# Patient Record
Sex: FEMALE | Race: WHITE | Employment: FULL TIME | URBAN - METROPOLITAN AREA
[De-identification: names, ages, dates, MRNs, and addresses within clinical notes are randomized per-mention and may not be internally consistent; named-entity substitution may affect disease eponyms.]

---

## 2017-07-17 PROCEDURE — 88175 CYTOPATH C/V AUTO FLUID REDO: CPT | Performed by: OBSTETRICS & GYNECOLOGY

## 2017-07-17 PROCEDURE — 87591 N.GONORRHOEAE DNA AMP PROB: CPT | Performed by: OBSTETRICS & GYNECOLOGY

## 2017-07-17 PROCEDURE — 87491 CHLMYD TRACH DNA AMP PROBE: CPT | Performed by: OBSTETRICS & GYNECOLOGY

## 2018-07-13 PROCEDURE — 87389 HIV-1 AG W/HIV-1&-2 AB AG IA: CPT | Performed by: OBSTETRICS & GYNECOLOGY

## 2018-07-13 PROCEDURE — 86900 BLOOD TYPING SEROLOGIC ABO: CPT | Performed by: OBSTETRICS & GYNECOLOGY

## 2018-07-13 PROCEDURE — 86850 RBC ANTIBODY SCREEN: CPT | Performed by: OBSTETRICS & GYNECOLOGY

## 2018-07-13 PROCEDURE — 86780 TREPONEMA PALLIDUM: CPT | Performed by: OBSTETRICS & GYNECOLOGY

## 2018-07-13 PROCEDURE — 87340 HEPATITIS B SURFACE AG IA: CPT | Performed by: OBSTETRICS & GYNECOLOGY

## 2018-07-13 PROCEDURE — 88175 CYTOPATH C/V AUTO FLUID REDO: CPT | Performed by: OBSTETRICS & GYNECOLOGY

## 2018-07-13 PROCEDURE — 87086 URINE CULTURE/COLONY COUNT: CPT | Performed by: OBSTETRICS & GYNECOLOGY

## 2018-07-13 PROCEDURE — 86901 BLOOD TYPING SEROLOGIC RH(D): CPT | Performed by: OBSTETRICS & GYNECOLOGY

## 2018-07-13 PROCEDURE — 86762 RUBELLA ANTIBODY: CPT | Performed by: OBSTETRICS & GYNECOLOGY

## 2018-07-13 PROCEDURE — 87624 HPV HI-RISK TYP POOLED RSLT: CPT | Performed by: OBSTETRICS & GYNECOLOGY

## 2018-11-21 PROCEDURE — 87389 HIV-1 AG W/HIV-1&-2 AB AG IA: CPT | Performed by: OBSTETRICS & GYNECOLOGY

## 2018-11-21 PROCEDURE — 86780 TREPONEMA PALLIDUM: CPT | Performed by: OBSTETRICS & GYNECOLOGY

## 2018-11-21 PROCEDURE — 86850 RBC ANTIBODY SCREEN: CPT | Performed by: OBSTETRICS & GYNECOLOGY

## 2019-01-22 ENCOUNTER — HOSPITAL ENCOUNTER (OUTPATIENT)
Facility: HOSPITAL | Age: 34
Setting detail: OBSERVATION
Discharge: HOME | End: 2019-01-22
Attending: OBSTETRICS & GYNECOLOGY | Admitting: OBSTETRICS & GYNECOLOGY
Payer: COMMERCIAL

## 2019-01-22 VITALS — HEART RATE: 94 BPM | DIASTOLIC BLOOD PRESSURE: 73 MMHG | SYSTOLIC BLOOD PRESSURE: 129 MMHG

## 2019-01-22 PROBLEM — Z34.90 PREGNANCY: Status: ACTIVE | Noted: 2019-01-22

## 2019-01-22 LAB
APTT PPP: 31.8 SECONDS (ref 23.2–35.3)
BASOPHILS # BLD: 0 K/UL (ref 0–0.2)
BASOPHILS NFR BLD: 0 %
EOSINOPHIL # BLD: 0.1 K/UL (ref 0–0.7)
EOSINOPHIL NFR BLD: 1 %
ERYTHROCYTE [DISTWIDTH] IN BLOOD BY AUTOMATED COUNT: 13.6 % (ref 11–15)
FIBRINOGEN PPP-MCNC: 712 MG/DL (ref 176–491)
HCT VFR BLD AUTO: 37 % (ref 35–48)
HGB BLD-MCNC: 12.4 G/DL (ref 12–16)
INR BLD: 1 (ref 0.9–1.2)
LYMPHOCYTES # BLD: 1.9 K/UL (ref 1–4)
LYMPHOCYTES NFR BLD: 12 %
MCH RBC QN AUTO: 30 PG (ref 27–32)
MCHC RBC AUTO-ENTMCNC: 33.5 G/DL (ref 32–37)
MCV RBC AUTO: 89.5 FL (ref 80–100)
MONOCYTES # BLD: 0.7 K/UL (ref 0–1)
MONOCYTES NFR BLD: 4 %
NEUTROPHILS # BLD AUTO: 12.9 K/UL (ref 1.8–7.7)
NEUTROPHILS NFR BLD: 83 %
PLATELET # BLD AUTO: 218 K/UL (ref 140–400)
PMV BLD AUTO: 9.8 FL (ref 7.4–10.3)
PROTHROMBIN TIME: 13.1 SECONDS (ref 11.8–14.5)
RBC # BLD AUTO: 4.14 M/UL (ref 3.7–5.4)
WBC # BLD AUTO: 15.6 K/UL (ref 4–11)

## 2019-01-22 PROCEDURE — 85730 THROMBOPLASTIN TIME PARTIAL: CPT | Performed by: OBSTETRICS & GYNECOLOGY

## 2019-01-22 PROCEDURE — 85362 FIBRIN DEGRADATION PRODUCTS: CPT | Performed by: OBSTETRICS & GYNECOLOGY

## 2019-01-22 PROCEDURE — 85384 FIBRINOGEN ACTIVITY: CPT | Performed by: OBSTETRICS & GYNECOLOGY

## 2019-01-22 PROCEDURE — 85610 PROTHROMBIN TIME: CPT | Performed by: OBSTETRICS & GYNECOLOGY

## 2019-01-22 PROCEDURE — 59025 FETAL NON-STRESS TEST: CPT

## 2019-01-22 PROCEDURE — 99203 OFFICE O/P NEW LOW 30 MIN: CPT

## 2019-01-22 PROCEDURE — 85025 COMPLETE CBC W/AUTO DIFF WBC: CPT | Performed by: OBSTETRICS & GYNECOLOGY

## 2019-01-22 PROCEDURE — 85460 HEMOGLOBIN FETAL: CPT | Performed by: OBSTETRICS & GYNECOLOGY

## 2019-01-22 PROCEDURE — 36415 COLL VENOUS BLD VENIPUNCTURE: CPT
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
## 2019-01-23 LAB — HGB F MFR BLD KLEIH BETKE: <0.1 %

## 2019-01-23 NOTE — TRIAGE
[facility]      Triage Note    Faith Minors Patient Status:  Observation    1985 MRN [id number]   Location [address]  Attending Arlene Keyes MD   Hosp Day # 0 PCP MD Venkata Marcum: outside while holding toddler      Cristal Chris RN  1/22/2019 7:37 PM

## 2019-02-08 ENCOUNTER — HOSPITAL ENCOUNTER (INPATIENT)
Facility: HOSPITAL | Age: 34
LOS: 2 days | Discharge: HOME | End: 2019-02-10
Attending: OBSTETRICS & GYNECOLOGY | Admitting: OBSTETRICS & GYNECOLOGY
Payer: COMMERCIAL

## 2019-02-08 LAB
ANTIBODY SCREEN: NEGATIVE
DEPRECATED RDW RBC AUTO: 42.5 FL (ref 35.1–46.3)
ERYTHROCYTE [DISTWIDTH] IN BLOOD BY AUTOMATED COUNT: 13.1 % (ref 11–15)
HCT VFR BLD AUTO: 37.3 % (ref 35–48)
HGB BLD-MCNC: 12.8 G/DL (ref 12–16)
MCH RBC QN AUTO: 30.7 PG (ref 26–34)
MCHC RBC AUTO-ENTMCNC: 34.3 G/DL (ref 31–37)
MCV RBC AUTO: 89.4 FL (ref 80–100)
PLATELET # BLD AUTO: 284 10(3)UL (ref 150–450)
RBC # BLD AUTO: 4.17 X10(6)UL (ref 3.8–5.3)
RH BLOOD TYPE: NEGATIVE
WBC # BLD AUTO: 14.3 X10(3) UL (ref 4–11)

## 2019-02-08 PROCEDURE — 85027 COMPLETE CBC AUTOMATED: CPT | Performed by: OBSTETRICS & GYNECOLOGY

## 2019-02-08 PROCEDURE — 86901 BLOOD TYPING SEROLOGIC RH(D): CPT | Performed by: OBSTETRICS & GYNECOLOGY

## 2019-02-08 PROCEDURE — 86850 RBC ANTIBODY SCREEN: CPT | Performed by: OBSTETRICS & GYNECOLOGY

## 2019-02-08 PROCEDURE — 0KQM0ZZ REPAIR PERINEUM MUSCLE, OPEN APPROACH: ICD-10-PCS | Performed by: OBSTETRICS & GYNECOLOGY

## 2019-02-08 PROCEDURE — 86900 BLOOD TYPING SEROLOGIC ABO: CPT | Performed by: OBSTETRICS & GYNECOLOGY

## 2019-02-08 RX ORDER — ACETAMINOPHEN 500 MG
500 TABLET ORAL ONCE AS NEEDED
Status: DISCONTINUED | OUTPATIENT
Start: 2019-02-08 | End: 2019-02-09 | Stop reason: HOSPADM

## 2019-02-08 RX ORDER — DEXTROSE, SODIUM CHLORIDE, SODIUM LACTATE, POTASSIUM CHLORIDE, AND CALCIUM CHLORIDE 5; .6; .31; .03; .02 G/100ML; G/100ML; G/100ML; G/100ML; G/100ML
INJECTION, SOLUTION INTRAVENOUS AS NEEDED
Status: DISCONTINUED | OUTPATIENT
Start: 2019-02-08 | End: 2019-02-09

## 2019-02-08 RX ORDER — DEXTROSE, SODIUM CHLORIDE, SODIUM LACTATE, POTASSIUM CHLORIDE, AND CALCIUM CHLORIDE 5; .6; .31; .03; .02 G/100ML; G/100ML; G/100ML; G/100ML; G/100ML
INJECTION, SOLUTION INTRAVENOUS
Status: COMPLETED
Start: 2019-02-08 | End: 2019-02-08

## 2019-02-08 RX ORDER — IBUPROFEN 600 MG/1
600 TABLET ORAL ONCE AS NEEDED
Status: DISCONTINUED | OUTPATIENT
Start: 2019-02-08 | End: 2019-02-09 | Stop reason: HOSPADM

## 2019-02-08 RX ORDER — SODIUM CHLORIDE 0.9 % (FLUSH) 0.9 %
10 SYRINGE (ML) INJECTION AS NEEDED
Status: DISCONTINUED | OUTPATIENT
Start: 2019-02-08 | End: 2019-02-09 | Stop reason: HOSPADM

## 2019-02-08 RX ORDER — AMMONIA INHALANTS 0.04 G/.3ML
0.3 INHALANT RESPIRATORY (INHALATION) AS NEEDED
Status: DISCONTINUED | OUTPATIENT
Start: 2019-02-08 | End: 2019-02-09

## 2019-02-08 RX ORDER — LIDOCAINE HYDROCHLORIDE 10 MG/ML
30 INJECTION, SOLUTION EPIDURAL; INFILTRATION; INTRACAUDAL; PERINEURAL ONCE
Status: COMPLETED | OUTPATIENT
Start: 2019-02-08 | End: 2019-02-09

## 2019-02-08 RX ORDER — TERBUTALINE SULFATE 1 MG/ML
0.25 INJECTION, SOLUTION SUBCUTANEOUS AS NEEDED
Status: DISCONTINUED | OUTPATIENT
Start: 2019-02-08 | End: 2019-02-09

## 2019-02-08 RX ORDER — ONDANSETRON 2 MG/ML
4 INJECTION INTRAMUSCULAR; INTRAVENOUS EVERY 6 HOURS PRN
Status: DISCONTINUED | OUTPATIENT
Start: 2019-02-08 | End: 2019-02-09 | Stop reason: HOSPADM

## 2019-02-09 LAB
HCT VFR BLD AUTO: 30.9 % (ref 35–48)
HGB BLD-MCNC: 10.4 G/DL (ref 12–16)

## 2019-02-09 PROCEDURE — 85014 HEMATOCRIT: CPT | Performed by: OBSTETRICS & GYNECOLOGY

## 2019-02-09 PROCEDURE — 85461 HEMOGLOBIN FETAL: CPT | Performed by: OBSTETRICS & GYNECOLOGY

## 2019-02-09 PROCEDURE — 85018 HEMOGLOBIN: CPT | Performed by: OBSTETRICS & GYNECOLOGY

## 2019-02-09 RX ORDER — DIAPER,BRIEF,INFANT-TODD,DISP
1 EACH MISCELLANEOUS EVERY 6 HOURS PRN
Status: DISCONTINUED | OUTPATIENT
Start: 2019-02-09 | End: 2019-02-10

## 2019-02-09 RX ORDER — PRENATAL VIT,CAL 76/IRON/FOLIC 29 MG-1 MG
1 TABLET ORAL DAILY
Status: DISCONTINUED | OUTPATIENT
Start: 2019-02-09 | End: 2019-02-10

## 2019-02-09 RX ORDER — SIMETHICONE 80 MG
80 TABLET,CHEWABLE ORAL 3 TIMES DAILY PRN
Status: DISCONTINUED | OUTPATIENT
Start: 2019-02-09 | End: 2019-02-10

## 2019-02-09 RX ORDER — SODIUM CHLORIDE 0.9 % (FLUSH) 0.9 %
10 SYRINGE (ML) INJECTION AS NEEDED
Status: DISCONTINUED | OUTPATIENT
Start: 2019-02-09 | End: 2019-02-10

## 2019-02-09 RX ORDER — ONDANSETRON 2 MG/ML
4 INJECTION INTRAMUSCULAR; INTRAVENOUS EVERY 6 HOURS PRN
Status: DISCONTINUED | OUTPATIENT
Start: 2019-02-09 | End: 2019-02-10

## 2019-02-09 RX ORDER — AMMONIA INHALANTS 0.04 G/.3ML
0.3 INHALANT RESPIRATORY (INHALATION) AS NEEDED
Status: DISCONTINUED | OUTPATIENT
Start: 2019-02-09 | End: 2019-02-10

## 2019-02-09 RX ORDER — BISACODYL 10 MG
10 SUPPOSITORY, RECTAL RECTAL ONCE AS NEEDED
Status: DISCONTINUED | OUTPATIENT
Start: 2019-02-09 | End: 2019-02-10

## 2019-02-09 RX ORDER — DOCUSATE SODIUM 100 MG/1
100 CAPSULE, LIQUID FILLED ORAL 2 TIMES DAILY
Status: DISCONTINUED | OUTPATIENT
Start: 2019-02-09 | End: 2019-02-10

## 2019-02-09 RX ORDER — IBUPROFEN 600 MG/1
600 TABLET ORAL EVERY 6 HOURS
Status: DISCONTINUED | OUTPATIENT
Start: 2019-02-09 | End: 2019-02-10

## 2019-02-09 RX ORDER — ACETAMINOPHEN 325 MG/1
650 TABLET ORAL EVERY 6 HOURS PRN
Status: DISCONTINUED | OUTPATIENT
Start: 2019-02-09 | End: 2019-02-10

## 2019-02-09 NOTE — PROGRESS NOTES
[facility]    OB/GYNE Progress Note      Donnamaria Prader Patient Status:  Inpatient    1985 MRN [id number]   Location [facility] 3SE Attending Bernardo Nguyen MD   Hosp Day # 1 PCP Geo Peña MD        Assessment/Plan 03/04/2016    SPECGRAVITY 1.020 01/07/2016    GLUUR neg 01/07/2016    NITRITE neg 01/07/2016                   Regi Berry MD  2/9/2019  10:52 AM

## 2019-02-09 NOTE — H&P
[address] Patient Status:  Inpatient    1985 MRN [id number]   Location [address]  Attending Bernardo Nguyen MD   Hosp Day # 1 PCP Geo Peña MD     Active Problems:    Iron defici guarding. Gravid, uterus nontender   Genitourinary: Vagina normal.   Genitourinary Comments: 9/52/-2, cephalic   Grossly ruptured, clear fluid   Musculoskeletal: Normal range of motion.    Neurological: She is alert and oriented to person, place, and time

## 2019-02-09 NOTE — PROGRESS NOTES
Pt is a 34year old female [facility] Patient presents with:  Leaking: Felt a gush of fluid come out around 6:30pm     Pt is  39w4d intra-uterine pregnancy. History obtained, consents signed.  Oriented to room, staff, and plan of car PRUDENCE   Known prior H/o PRUDENCE/AIN related to Ancef in March 2018   D/C Ancef   Would also stop bactrim in setting of PRUDENCE   Urine eosinophils, Stacia   Hold Lisinopril   Will cont gentle IVF   ID consult called for alternative Abx   If renal function not better in am   will get sonogram and   may need consider steroids- AIN?    Will follow

## 2019-02-09 NOTE — PROGRESS NOTES
Pt is a 34year old female [facility] Patient presents with:  Leaking: Felt a gush of fluid come out around 6:30pm     Pt is  39w4d intra-uterine pregnancy. History obtained, consents signed.  Oriented to room, staff, and plan of car

## 2019-02-09 NOTE — L&D DELIVERY NOTE
[facility]    Vaginal Delivery Note    Jessica Vegas Patient Status:  Inpatient    1985 MRN [id number]   Location [address]  Attending Alphonso Potter MD   Hosp Day # 1 PCP MD Sandra Sanchez

## 2019-02-09 NOTE — PROGRESS NOTES
Patient up to bathroom with assist x 2. Unable to void at this time. Patient transferred to mother/baby room 354 per wheelchair in stable condition with baby and personal belongings. Accompanied by significant other and staff.   Report given to mother/bab

## 2019-02-09 NOTE — PROGRESS NOTES
Received patient into room 354. Bedside shift report received from [facility]. Patient transferred to bed from cart. Bed in locked and low position. Side rails up X2. Vital signs stable, fundus firm at U/1, lochia small, no clots noted.   Patient unab

## 2019-02-09 NOTE — LACTATION NOTE
LACTATION NOTE - MOTHER      Evaluation Type: Inpatient    Problems identified  Problems identified: Knowledge deficit    Breastfeeding goal  Breastfeeding goal: To maintain breast milk feeding per patient goal    Maternal Assessment  Bilateral Breasts:  So

## 2019-02-09 NOTE — LACTATION NOTE
This note was copied from a baby's chart. LACTATION NOTE - INFANT    Evaluation Type  Evaluation Type: Inpatient    Problems & Assessment  Muscle tone: Appropriate for GA    Feeding Assessment  Summary Current Feeding: Breastfeeding exclusively; Adlib  Shania

## 2019-02-10 VITALS
TEMPERATURE: 98 F | RESPIRATION RATE: 18 BRPM | DIASTOLIC BLOOD PRESSURE: 66 MMHG | HEART RATE: 70 BPM | SYSTOLIC BLOOD PRESSURE: 111 MMHG

## 2019-02-10 RX ORDER — PSEUDOEPHEDRINE HCL 30 MG
100 TABLET ORAL 2 TIMES DAILY
Qty: 60 CAPSULE | Refills: 1 | Status: SHIPPED | OUTPATIENT
Start: 2019-02-10 | End: 2019-05-28

## 2019-02-10 RX ORDER — IBUPROFEN 600 MG/1
600 TABLET ORAL EVERY 6 HOURS
Qty: 60 TABLET | Refills: 1 | Status: SHIPPED | OUTPATIENT
Start: 2019-02-10 | End: 2019-03-21

## 2019-02-10 RX ORDER — DIAPER,BRIEF,INFANT-TODD,DISP
1 EACH MISCELLANEOUS EVERY 6 HOURS PRN
Refills: 0 | Status: SHIPPED | COMMUNITY
Start: 2019-02-10 | End: 2019-03-21

## 2019-02-10 NOTE — DISCHARGE SUMMARY
[facility]    Discharge Summary    Corine Welsh Patient Status:  Inpatient    1985 MRN [id number]   Location [facility] 3SE Attending Debbi Hudson MD   Hosp Day # 2 PCP Radha Salas MD     Date of Admission:  [address]           Sola Soares MD In 6 weeks.     Specialty:  OBSTETRICS & GYNECOLOGY  Contact information:  [facility] [place] [address]  [phone]                       Other Discharge Instructions:       No he

## 2019-02-10 NOTE — LACTATION NOTE
LACTATION NOTE - MOTHER      Evaluation Type: Inpatient from 1000  Maternal history  Maternal history: Polycystic ovarian syndrome (PCOS)    Breastfeeding goal  Breastfeeding goal: To maintain breast milk feeding per patient goal    Maternal Assessment  Bi

## 2021-05-19 ENCOUNTER — LAB ENCOUNTER (OUTPATIENT)
Dept: LAB | Age: 36
End: 2021-05-19
Attending: OBSTETRICS & GYNECOLOGY
Payer: COMMERCIAL

## 2021-05-19 ENCOUNTER — TELEPHONE (OUTPATIENT)
Dept: OBGYN UNIT | Facility: HOSPITAL | Age: 36
End: 2021-05-19

## 2021-05-19 DIAGNOSIS — Z34.80 SUPERVISION OF OTHER NORMAL PREGNANCY: ICD-10-CM

## 2021-05-20 ENCOUNTER — TELEPHONE (OUTPATIENT)
Dept: OBGYN UNIT | Facility: HOSPITAL | Age: 36
End: 2021-05-20

## 2021-05-22 ENCOUNTER — ANESTHESIA (OUTPATIENT)
Dept: OBGYN UNIT | Facility: HOSPITAL | Age: 36
End: 2021-05-22
Payer: COMMERCIAL

## 2021-05-22 ENCOUNTER — ANESTHESIA EVENT (OUTPATIENT)
Dept: OBGYN UNIT | Facility: HOSPITAL | Age: 36
End: 2021-05-22
Payer: COMMERCIAL

## 2021-05-22 ENCOUNTER — APPOINTMENT (OUTPATIENT)
Dept: OBGYN CLINIC | Facility: HOSPITAL | Age: 36
End: 2021-05-22
Payer: COMMERCIAL

## 2021-05-22 ENCOUNTER — HOSPITAL ENCOUNTER (INPATIENT)
Facility: HOSPITAL | Age: 36
LOS: 2 days | Discharge: HOME | End: 2021-05-24
Attending: OBSTETRICS & GYNECOLOGY | Admitting: OBSTETRICS & GYNECOLOGY
Payer: COMMERCIAL

## 2021-05-22 PROBLEM — Z34.80 SUPERVISION OF OTHER NORMAL PREGNANCY: Status: ACTIVE | Noted: 2021-05-22

## 2021-05-22 PROCEDURE — 86850 RBC ANTIBODY SCREEN: CPT | Performed by: OBSTETRICS & GYNECOLOGY

## 2021-05-22 PROCEDURE — 86900 BLOOD TYPING SEROLOGIC ABO: CPT | Performed by: OBSTETRICS & GYNECOLOGY

## 2021-05-22 PROCEDURE — 86780 TREPONEMA PALLIDUM: CPT | Performed by: OBSTETRICS & GYNECOLOGY

## 2021-05-22 PROCEDURE — 86701 HIV-1ANTIBODY: CPT | Performed by: OBSTETRICS & GYNECOLOGY

## 2021-05-22 PROCEDURE — 85461 HEMOGLOBIN FETAL: CPT | Performed by: OBSTETRICS & GYNECOLOGY

## 2021-05-22 PROCEDURE — 86901 BLOOD TYPING SEROLOGIC RH(D): CPT | Performed by: OBSTETRICS & GYNECOLOGY

## 2021-05-22 PROCEDURE — 3E033VJ INTRODUCTION OF OTHER HORMONE INTO PERIPHERAL VEIN, PERCUTANEOUS APPROACH: ICD-10-PCS | Performed by: OBSTETRICS & GYNECOLOGY

## 2021-05-22 PROCEDURE — 85025 COMPLETE CBC W/AUTO DIFF WBC: CPT | Performed by: OBSTETRICS & GYNECOLOGY

## 2021-05-22 RX ORDER — BUPIVACAINE HCL/0.9 % NACL/PF 0.25 %
5 PLASTIC BAG, INJECTION (ML) EPIDURAL AS NEEDED
Status: DISCONTINUED | OUTPATIENT
Start: 2021-05-22 | End: 2021-05-23 | Stop reason: HOSPADM

## 2021-05-22 RX ORDER — DOCUSATE SODIUM 100 MG/1
100 CAPSULE, LIQUID FILLED ORAL 2 TIMES DAILY
Status: DISCONTINUED | OUTPATIENT
Start: 2021-05-23 | End: 2021-05-24

## 2021-05-22 RX ORDER — ACETAMINOPHEN 650 MG/1
650 SUPPOSITORY RECTAL EVERY 6 HOURS PRN
Status: DISCONTINUED | OUTPATIENT
Start: 2021-05-22 | End: 2021-05-22 | Stop reason: HOSPADM

## 2021-05-22 RX ORDER — BISACODYL 10 MG
10 SUPPOSITORY, RECTAL RECTAL ONCE AS NEEDED
Status: DISCONTINUED | OUTPATIENT
Start: 2021-05-22 | End: 2021-05-24

## 2021-05-22 RX ORDER — IBUPROFEN 600 MG/1
600 TABLET ORAL EVERY 6 HOURS
Status: DISCONTINUED | OUTPATIENT
Start: 2021-05-22 | End: 2021-05-24

## 2021-05-22 RX ORDER — CHOLECALCIFEROL (VITAMIN D3) 25 MCG
1 TABLET,CHEWABLE ORAL DAILY
Status: DISCONTINUED | OUTPATIENT
Start: 2021-05-23 | End: 2021-05-24

## 2021-05-22 RX ORDER — SIMETHICONE 80 MG
80 TABLET,CHEWABLE ORAL 3 TIMES DAILY PRN
Status: DISCONTINUED | OUTPATIENT
Start: 2021-05-22 | End: 2021-05-24

## 2021-05-22 RX ORDER — LIDOCAINE HYDROCHLORIDE 10 MG/ML
INJECTION, SOLUTION EPIDURAL; INFILTRATION; INTRACAUDAL; PERINEURAL
Status: DISPENSED
Start: 2021-05-22 | End: 2021-05-22

## 2021-05-22 RX ORDER — TRISODIUM CITRATE DIHYDRATE AND CITRIC ACID MONOHYDRATE 500; 334 MG/5ML; MG/5ML
30 SOLUTION ORAL AS NEEDED
Status: DISCONTINUED | OUTPATIENT
Start: 2021-05-22 | End: 2021-05-22 | Stop reason: HOSPADM

## 2021-05-22 RX ORDER — NALBUPHINE HCL 10 MG/ML
2.5 AMPUL (ML) INJECTION
Status: DISCONTINUED | OUTPATIENT
Start: 2021-05-22 | End: 2021-05-23 | Stop reason: HOSPADM

## 2021-05-22 RX ORDER — BUPIVACAINE HYDROCHLORIDE 2.5 MG/ML
20 INJECTION, SOLUTION EPIDURAL; INFILTRATION; INTRACAUDAL ONCE
Status: COMPLETED | OUTPATIENT
Start: 2021-05-22 | End: 2021-05-22

## 2021-05-22 RX ORDER — AMMONIA INHALANTS 0.04 G/.3ML
0.3 INHALANT RESPIRATORY (INHALATION) AS NEEDED
Status: DISCONTINUED | OUTPATIENT
Start: 2021-05-22 | End: 2021-05-22 | Stop reason: HOSPADM

## 2021-05-22 RX ORDER — LIDOCAINE HYDROCHLORIDE 10 MG/ML
INJECTION, SOLUTION EPIDURAL; INFILTRATION; INTRACAUDAL; PERINEURAL AS NEEDED
Status: DISCONTINUED | OUTPATIENT
Start: 2021-05-22 | End: 2021-05-22 | Stop reason: SURG

## 2021-05-22 RX ORDER — TERBUTALINE SULFATE 1 MG/ML
0.25 INJECTION, SOLUTION SUBCUTANEOUS AS NEEDED
Status: DISCONTINUED | OUTPATIENT
Start: 2021-05-22 | End: 2021-05-22 | Stop reason: HOSPADM

## 2021-05-22 RX ORDER — DIAPER,BRIEF,INFANT-TODD,DISP
1 EACH MISCELLANEOUS EVERY 6 HOURS PRN
Status: DISCONTINUED | OUTPATIENT
Start: 2021-05-22 | End: 2021-05-24

## 2021-05-22 RX ORDER — ONDANSETRON 2 MG/ML
4 INJECTION INTRAMUSCULAR; INTRAVENOUS EVERY 6 HOURS PRN
Status: DISCONTINUED | OUTPATIENT
Start: 2021-05-22 | End: 2021-05-24

## 2021-05-22 RX ORDER — AMMONIA INHALANTS 0.04 G/.3ML
0.3 INHALANT RESPIRATORY (INHALATION) AS NEEDED
Status: DISCONTINUED | OUTPATIENT
Start: 2021-05-22 | End: 2021-05-24

## 2021-05-22 RX ORDER — ACETAMINOPHEN 325 MG/1
650 TABLET ORAL EVERY 6 HOURS PRN
Status: DISCONTINUED | OUTPATIENT
Start: 2021-05-22 | End: 2021-05-24

## 2021-05-22 RX ORDER — IBUPROFEN 600 MG/1
600 TABLET ORAL ONCE AS NEEDED
Status: DISCONTINUED | OUTPATIENT
Start: 2021-05-22 | End: 2021-05-22 | Stop reason: HOSPADM

## 2021-05-22 RX ORDER — LIDOCAINE HYDROCHLORIDE AND EPINEPHRINE 15; 5 MG/ML; UG/ML
INJECTION, SOLUTION EPIDURAL
Status: COMPLETED | OUTPATIENT
Start: 2021-05-22 | End: 2021-05-22

## 2021-05-22 RX ADMIN — LIDOCAINE HYDROCHLORIDE 3 ML: 10 INJECTION, SOLUTION EPIDURAL; INFILTRATION; INTRACAUDAL; PERINEURAL at 18:42:00

## 2021-05-22 RX ADMIN — BUPIVACAINE HYDROCHLORIDE 3 ML: 2.5 INJECTION, SOLUTION EPIDURAL; INFILTRATION; INTRACAUDAL at 18:51:00

## 2021-05-22 RX ADMIN — LIDOCAINE HYDROCHLORIDE AND EPINEPHRINE 5 ML: 15; 5 INJECTION, SOLUTION EPIDURAL at 18:47:00

## 2021-05-22 NOTE — H&P
[address] Patient Status:  Inpatient    1985 MRN [id number]   Location [address] [facility] Attending Arlene Keyes MD   Hosp Day # 0 PCP Sherin Garner MD     Date of Admi the past 24 hour(s))   CBC W/ DIFFERENTIAL    Collection Time: 05/22/21  7:34 AM   Result Value Ref Range    WBC 8.8 4.0 - 11.0 x10(3) uL    RBC 3.94 3.80 - 5.30 x10(6)uL    HGB 12.0 12.0 - 16.0 g/dL    HCT 35.4 35.0 - 48.0 %    MCV 89.8 80.0 - 100.0 fL appropriate consents will be signed at the [address] [place] [address]  5/22/2021  12:30 PM

## 2021-05-22 NOTE — PROGRESS NOTES
Pt is a 37year old female admitted to [address] [place] [address]. Patient presents with:  Scheduled Induction: post dates     Pt is  40w6d intra-uterine pregnancy. History obtained, consents signed. Oriented to room, staff, and plan of care.

## 2021-05-23 PROCEDURE — 85025 COMPLETE CBC W/AUTO DIFF WBC: CPT | Performed by: OBSTETRICS & GYNECOLOGY

## 2021-05-23 PROCEDURE — 3E0334Z INTRODUCTION OF SERUM, TOXOID AND VACCINE INTO PERIPHERAL VEIN, PERCUTANEOUS APPROACH: ICD-10-PCS | Performed by: OBSTETRICS & GYNECOLOGY

## 2021-05-23 NOTE — ANESTHESIA POSTPROCEDURE EVALUATION
Patient: Latosha Perez    Procedure Summary     Date: 05/22/21 Room / Location:     Anesthesia Start: 5090 Anesthesia Stop: 1917    Procedure: LABOR ANALGESIA Diagnosis:     Scheduled Providers:  Anesthesiologist: Saundra Tejeda MD    Anesthesia Type: e

## 2021-05-23 NOTE — PROGRESS NOTES
[facility]    OB/GYNE Progress Note      Aryan Lemon Patient Status:  Inpatient    1985 MRN [id number]   Location [facility] 3SE Attending Brook Moody MD   Hosp Day # 1 PCP Deng Dior MD       Assessment/Plan     A 31.8 01/22/2019    INR 1.0 01/22/2019    T4F 1.29 08/14/2014    TSH 0.556 05/28/2019       Lab Results   Component Value Date    RPR Non Reactive 03/04/2016    SPECGRAVITY 1.010 05/19/2021    NITRITE Negative 05/19/2021     No results found.     Elsa Blevins

## 2021-05-23 NOTE — L&D DELIVERY NOTE
[facility] - [facility]    Vaginal Delivery Note    Omar Walsh Patient Status:  Inpatient    1985 MRN [id number]   Location [address]  Attending Donato Camarena MD   Hosp Day # 0 PCP Lucy Prabhakar MD     Delivery

## 2021-05-23 NOTE — ANESTHESIA PREPROCEDURE EVALUATION
Anesthesia PreOp Note    HPI:     Belle Quintero is a 37year old female who presents for preoperative consultation requested by: * No surgeons listed *    Date of Surgery: 5/22/2021    * No procedures listed *  Indication: * No pre-op diagnosis entered * pete-units/min at 05/22/21 1700  Sod Citrate-Citric Acid (BICITRA) solution 30 mL, 30 mL, Oral, PRN, Tao COOK MD  Terbutaline Sulfate (BRETHINE) 1 MG/ML injection 0.25 mg, 0.25 mg, Subcutaneous, PRN, Guevara Man MD  lidocaine (PF) (XYLOCAINE) Not Asked        Blood Transfusions: Not Asked        Caffeine Concern: Not Asked        Occupational Exposure: Not Asked        Hobby Hazards: Not Asked        Sleep Concern: Not Asked        Stress Concern: Not Asked        Weight Concern: Not Asked height is 1.676 m (5' 6\") and weight is 79.4 kg (175 lb). Her oral temperature is 98.2 °F (36.8 °C). Her blood pressure is 111/58 and her pulse is 74.  Her respiration is 16.    05/22/21  0945 05/22/21  1130 05/22/21  1330 05/22/21  1745   BP: 127/83 111/8

## 2021-05-23 NOTE — PLAN OF CARE
Patient received into room 366 via wheelchair. Beside report received from Kim Archer RN. Patient transferred to bed without incident. Bed in locked and low position. Side rails up x 2. VSS. Fundus firm at u/u, lochia small, no clots noted. IV intact.  Pain information as needed about early infant feeding cues (e.g., rooting, lip smacking, sucking fingers/hand) versus late cue of crying.  - Discuss/demonstrate breast feeding aids (e.g., infant sling, nursing footstool/pillows, and breast pumps).   - Encourage Progressing

## 2021-05-23 NOTE — PROGRESS NOTES
Patient up to bathroom with assist x 2. Unable to void at this time. Straight cath 700ml. Patient transferred to mother/baby room 366 per wheelchair in stable condition with baby and personal belongings. Accompanied by significant other and staff.   Report

## 2021-05-24 VITALS
OXYGEN SATURATION: 98 % | HEIGHT: 66 IN | TEMPERATURE: 98 F | SYSTOLIC BLOOD PRESSURE: 108 MMHG | HEART RATE: 59 BPM | WEIGHT: 175 LBS | BODY MASS INDEX: 28.13 KG/M2 | RESPIRATION RATE: 16 BRPM | DIASTOLIC BLOOD PRESSURE: 68 MMHG

## 2021-05-24 RX ORDER — IBUPROFEN 600 MG/1
600 TABLET ORAL EVERY 6 HOURS
Qty: 30 TABLET | Refills: 0 | Status: SHIPPED | OUTPATIENT
Start: 2021-05-24

## 2021-05-24 RX ORDER — ACETAMINOPHEN 325 MG/1
650 TABLET ORAL EVERY 6 HOURS PRN
Qty: 30 TABLET | Refills: 0 | Status: SHIPPED | OUTPATIENT
Start: 2021-05-24

## 2021-05-24 NOTE — DISCHARGE SUMMARY
[facility]    Discharge Summary    Oleksandr Christine Patient Status:  Inpatient    1985 MRN [id number]   Location [facility] 3SE Attending Sahra Gonzalez MD   [facility] Day # 2 PCP Vincenzo Freeman MD     Date of Admission: 2021 [facility] [address]  [phone]                           Other Discharge Instructions:       Call for increase pain, bleeding or temperature  > 100.4. If symptoms of depression or anxiety occur also call or any other concerns.          [employer]

## 2021-06-04 ENCOUNTER — TELEPHONE (OUTPATIENT)
Dept: OBGYN UNIT | Facility: HOSPITAL | Age: 36
End: 2021-06-04

## 2023-02-09 LAB
AMB EXT CHLAMYDIA, NUCLEIC ACID AMP: NOT DETECTED
AMB EXT GONOCOCCUS, NUCLEIC ACID AMP: NOT DETECTED

## 2023-04-07 LAB
ANTIBODY SCREEN OB: NEGATIVE
HEPATITIS B SURFACE ANTIGEN OB: NEGATIVE
RAPID PLASMA REAGIN OB: NONREACTIVE

## 2023-07-11 LAB
AMB EXT TREPONEMAL ANTIBODIES: NONREACTIVE
HIV RESULT OB: NEGATIVE

## 2023-09-01 LAB — STREP GP B CULT OB: NEGATIVE

## 2023-09-08 ENCOUNTER — APPOINTMENT (OUTPATIENT)
Dept: OBGYN CLINIC | Facility: HOSPITAL | Age: 38
End: 2023-09-08
Payer: COMMERCIAL

## 2023-09-08 ENCOUNTER — HOSPITAL ENCOUNTER (OUTPATIENT)
Facility: HOSPITAL | Age: 38
Discharge: HOME | End: 2023-09-08
Attending: OBSTETRICS & GYNECOLOGY | Admitting: OBSTETRICS & GYNECOLOGY
Payer: COMMERCIAL

## 2023-09-08 VITALS
HEART RATE: 69 BPM | WEIGHT: 175 LBS | SYSTOLIC BLOOD PRESSURE: 113 MMHG | HEIGHT: 66 IN | DIASTOLIC BLOOD PRESSURE: 68 MMHG | BODY MASS INDEX: 28.13 KG/M2

## 2023-09-08 LAB
BASOPHILS # BLD AUTO: 0.01 X10(3) UL (ref 0–0.2)
BASOPHILS NFR BLD AUTO: 0.1 %
DEPRECATED RDW RBC AUTO: 43.4 FL (ref 35.1–46.3)
EOSINOPHIL # BLD AUTO: 0.04 X10(3) UL (ref 0–0.7)
EOSINOPHIL NFR BLD AUTO: 0.4 %
ERYTHROCYTE [DISTWIDTH] IN BLOOD BY AUTOMATED COUNT: 13.2 % (ref 11–15)
HCT VFR BLD AUTO: 35.8 %
HGB BLD-MCNC: 12.1 G/DL
IMM GRANULOCYTES # BLD AUTO: 0.03 X10(3) UL (ref 0–1)
IMM GRANULOCYTES NFR BLD: 0.3 %
LYMPHOCYTES # BLD AUTO: 2.07 X10(3) UL (ref 1–4)
LYMPHOCYTES NFR BLD AUTO: 21.7 %
MCH RBC QN AUTO: 30.6 PG (ref 26–34)
MCHC RBC AUTO-ENTMCNC: 33.8 G/DL (ref 31–37)
MCV RBC AUTO: 90.4 FL
MONOCYTES # BLD AUTO: 0.55 X10(3) UL (ref 0.1–1)
MONOCYTES NFR BLD AUTO: 5.8 %
NEUTROPHILS # BLD AUTO: 6.83 X10 (3) UL (ref 1.5–7.7)
NEUTROPHILS # BLD AUTO: 6.83 X10(3) UL (ref 1.5–7.7)
NEUTROPHILS NFR BLD AUTO: 71.7 %
PLATELET # BLD AUTO: 196 10(3)UL (ref 150–450)
RBC # BLD AUTO: 3.96 X10(6)UL
WBC # BLD AUTO: 9.5 X10(3) UL (ref 4–11)

## 2023-09-08 PROCEDURE — 85025 COMPLETE CBC W/AUTO DIFF WBC: CPT | Performed by: OBSTETRICS & GYNECOLOGY

## 2023-09-08 PROCEDURE — 59025 FETAL NON-STRESS TEST: CPT

## 2023-09-08 PROCEDURE — 36415 COLL VENOUS BLD VENIPUNCTURE: CPT

## 2023-09-08 RX ORDER — SODIUM CHLORIDE, SODIUM LACTATE, POTASSIUM CHLORIDE, CALCIUM CHLORIDE 600; 310; 30; 20 MG/100ML; MG/100ML; MG/100ML; MG/100ML
INJECTION, SOLUTION INTRAVENOUS CONTINUOUS
Status: DISCONTINUED | OUTPATIENT
Start: 2023-09-08 | End: 2023-09-08

## 2023-09-08 RX ORDER — TERBUTALINE SULFATE 1 MG/ML
0.25 INJECTION, SOLUTION SUBCUTANEOUS ONCE
Status: DISCONTINUED | OUTPATIENT
Start: 2023-09-08 | End: 2023-09-08

## 2023-09-08 RX ORDER — TERBUTALINE SULFATE 1 MG/ML
0.25 INJECTION, SOLUTION SUBCUTANEOUS
Status: DISCONTINUED | OUTPATIENT
Start: 2023-09-08 | End: 2023-09-08

## 2023-09-08 NOTE — PROGRESS NOTES
Pt is a 41year old female admitted to TR5/TR5-A. Patient presents with:  External Version     Pt is  Unknown intra-uterine pregnancy. History obtained, consents signed. Oriented to room, staff, and plan of care.

## 2023-09-08 NOTE — TRIAGE
[facility]      Triage Note    Ledy Seat Patient Status:  Outpatient    1985 MRN [id number]   Location [address] Attending Abraham Esquivel MD   [address] [facility] [address] [facility] [address] Day # 0 PCP MD Abbey Dorado: [id number]  Estimated Date of Delivery: 23  Gestation: 37w0d    Chief Complaint    External Version         Allergies:  No Known Allergies    Orders Placed This Encounter      STREP GP B CULT/DNA PROBE      Antibody Identification (titer)      Rubella, IGG      RPR W/Conf      Hepatitis B Surface Antigen      Rapid HIV      T Pallidum Screening Cascade      CBC With Differential With Platelet      Chlamydia/Gc Amplification      Lab Results   Component Value Date    WBC 9.5 2023    HGB 12.1 2023    HCT 35.8 2023    .0 2023    CREATSERUM 0.72 2019    BUN 18.0 2019     2019    K 3.91 2019     2019    CO2 25.5 2019    GLU 90 2019    CA 9.2 2019    ALB 4.5 2019    ALKPHO 67 2019    BILT 0.24 2019    TP 7.3 2019    AST 14 2019    ALT 18 2019    PTT 31.8 2019    INR 1.0 2019    T4F 1.29 2014    TSH 0.556 2019    RPR Non Reactive 2016       Clinitek UA  Lab Results   Component Value Date    SPECGRAVITY 1.010 2021    URINECUL  2018     <10,000 cfu/ml Mixture of Gram positive organisms isolated - probable contamination.        UA  Lab Results   Component Value Date    SPECGRAVITY 1.010 2021    NITRITE Negative 2021  0707 23  0715   BP:  113/68   Pulse:  69   Weight: 79.4 kg (175 lb)    Height: 167.6 cm (5' 6\")        NST  Variability: (P) Moderate           Accelerations: (P) Yes           Decelerations: (P) None            Baseline: (P) 130 BPM           Uterine Irritability: (P) No           Contractions: (P) Irregular                    Contraction Frequency: (P) irregular                   Acoustic Stimulator: (P) No           Nonstress Test Interpretation: (P) Reactive           Nonstress Test Second Interpretation: Reactive                        Additional Comments Comments: (P)  at 37 0/7 weeks reports to triage for ECV and is found to be cephalic by ultrasound by Dr. Alan Sharpe. NST reactive. Side-lying release taught to patient and significant other. All questions answered at this time. Discharged in stable condition per Dr. Alan Sharpe.      Patient presents with:  External Version        Annie Segura RN  2023 9:19 AM

## 2023-09-08 NOTE — H&P
[address] Patient Status:  Outpatient    1985 MRN [id number]   Location [address] Attending Reynold Petit MD   [facility] Day # 0 PCP Corona Cottrell MD     Date of Admission:  2023      HPI:   Iram Blakely is a 41year old  female, current EGA of 37w0d with an estimated date of delivery of: 2023, by Patient Reported who presents for external cephalic version      Pt denies N/V/F/C/CP/SOB, HA, blurry vision, dizziness, RUQ pain, ctx, lof, VB. Her current obstetrical history is significant for AMA and Rh negative. Patient Active Problem List:     Acne     Family planning     Iron deficiency anemia     Rh negative state in antepartum period     Pregnancy     Normal labor and delivery     Supervision of other normal pregnancy     Vaginal delivery        Fetal Movement reported as good. GBS negative. Rh negative. History   Obstetric History:   OB History    Para Term  AB Living   4 3 3 0 0 3   SAB IAB Ectopic Multiple Live Births   0 0 0 0 3      # Outcome Date GA Lbr Montrell/2nd Weight Sex Delivery Anes PTL Lv   4 Current            3 Term 21 40w6d / 00:15 8 lb 15.9 oz (4.08 kg) F NORMAL SPONT EPI N JULI   2 Term 19 39w4d 04:56 / 00:30 8 lb 14.9 oz (4.05 kg) F NORMAL SPONT None N JULI   1 Term 16 41w1d  7 lb 8 oz (3.402 kg) M NORMAL SPONT   JULI      Obstetric Comments   Irregular menses - q2-3 months   No h/o STI's       Gyne History:   Last pap smear: 2023: Negative cytology and HR-HPV not detected    Past Medical History:   Past Medical History:   Diagnosis Date    ACNE     ocp    ANEMIA     19 none since, iron deficiency    OTHER DISEASES     uti as child, normal workup    OTHER DISEASES     hemorrhagic left om     PCOS (polycystic ovarian syndrome)        Past Surgerical History: History reviewed. No pertinent surgical history.     Social History: Social History    Tobacco Use      Smoking status: Never      Smokeless tobacco: Never    Alcohol use: Not Currently      Comment: 1x/wk - 2 drinks       Allergies/Medications: Allergies:   No Known Allergies    Medications:  Prenatal 27-0.8 MG Oral Tab, Take 1 tablet by mouth daily. , Disp: , Rfl: , 9/7/2023  acetaminophen 325 MG Oral Tab, Take 2 tablets (650 mg total) by mouth every 6 (six) hours as needed. , Disp: 30 tablet, Rfl: 0  ibuprofen 600 MG Oral Tab, Take 1 tablet (600 mg total) by mouth every 6 (six) hours. , Disp: 30 tablet, Rfl: 0          Review of Systems:   As documented in HPI      Physical Exam:   Pulse:  [69] 69  BP: (113)/(68) 113/68    Constitutional: alert and cooperative in No distress    Abdomen: soft,  nontender, gravid    Vaginal exam: Dilation: 0 cm    Effacement: 0 %    Station: ballotable    Consistency: medium    Position: posterior    FHT assessment:   Baseline: 130 bpm   Variability: moderate   Accels:  present   Decels: No   Tocos:  rare   Category: 1 tracing    Neurologic: Alert and oriented  Psychiatric: Cooperative    Results:     Recent Results (from the past 24 hour(s))   CBC W/ DIFFERENTIAL    Collection Time: 09/08/23  7:23 AM   Result Value Ref Range    WBC 9.5 4.0 - 11.0 x10(3) uL    RBC 3.96 3.80 - 5.30 x10(6)uL    HGB 12.1 12.0 - 16.0 g/dL    HCT 35.8 35.0 - 48.0 %    MCV 90.4 80.0 - 100.0 fL    MCH 30.6 26.0 - 34.0 pg    MCHC 33.8 31.0 - 37.0 g/dL    RDW-SD 43.4 35.1 - 46.3 fL    RDW 13.2 11.0 - 15.0 %    .0 150.0 - 450.0 10(3)uL    Neutrophil Absolute Prelim 6.83 1.50 - 7.70 x10 (3) uL    Neutrophil Absolute 6.83 1.50 - 7.70 x10(3) uL    Lymphocyte Absolute 2.07 1.00 - 4.00 x10(3) uL    Monocyte Absolute 0.55 0.10 - 1.00 x10(3) uL    Eosinophil Absolute 0.04 0.00 - 0.70 x10(3) uL    Basophil Absolute 0.01 0.00 - 0.20 x10(3) uL    Immature Granulocyte Absolute 0.03 0.00 - 1.00 x10(3) uL    Neutrophil % 71.7 %    Lymphocyte % 21.7 %    Monocyte % 5.8 %    Eosinophil % 0.4 %    Basophil % 0.1 %    Immature Granulocyte % 0.3 %       No results found. 2023: MFM US at 32 + weeks:  Single IUP in breech presentation. Cardiac activity is present at  136 bpm.  Placenta is fundal.   A 3 vessel cord  is noted. EFW 2152 g (4 lb   12 oz) 55%. BERTA is 15 cm. MVP is 6.6 cm. BPP is       Procedure     Limited OB Ultrasound (10495)    [facility]ACILITY[facility]  EXAMINATION DATE: 2023     Indication: position check  EGA: 37w0d     Findings  Number of gestations: perez  Presentation:  cephalic  Fetal Cardiac Activity: present, 136 bpm  Placenta: fundal  BERTA: subjectively normal  Movement: Gross and fine movement visualized    Impression  Perez IUP in the cephalic position    Performed and Read By: Kristen Holland MD     Assessment/Plan:   IUP 37w0d presented for external cephalic version    Obstetrical history significant for AMA and Rh negative. Patient Active Problem List:     Acne     Family planning     Iron deficiency anemia     Rh negative state in antepartum period     Pregnancy     Normal labor and delivery     Supervision of other normal pregnancy     Vaginal delivery        Treatment Plan:      Esther Stover is a 41year old  female, current EGA of 37w0d who presents for external cephalic version    Risks, benefits, alternatives and possible complications have been discussed in detail with the patient. Pre-admission, admission, and post admission procedures and expectations were discussed in detail. All questions answered; all appropriate consents will be signed at the [place] [id number] at 37w0d  Fetal heart tones category 1  Breech: pt interested in external cephalic version. Discussed r/b/a. She verbalized understanding. Possible need for C/s and its r/b/a discussed as well. On US pt was found to be cephalic. On exam the head is not engaged and bollotable. Discussed that the baby may flip again.   The RN Lee Bull is counseling on exercises (Spinning babies) to help the baby's head engage in the pelvis. External cephalic version was not needed today.   GBS negative  Rh negative: Pt received Rhogam.  CPM      Cayla Nesbitt MD  9/8/2023  7:17 AM

## 2023-09-08 NOTE — PROGRESS NOTES
Side-lying release taught to patient and . Encourage pt to continue her routine of stretching. All questions answered at this time.

## 2023-09-23 ENCOUNTER — HOSPITAL ENCOUNTER (INPATIENT)
Facility: HOSPITAL | Age: 38
LOS: 2 days | Discharge: HOME | End: 2023-09-25
Attending: OBSTETRICS & GYNECOLOGY | Admitting: OBSTETRICS & GYNECOLOGY
Payer: COMMERCIAL

## 2023-09-23 ENCOUNTER — APPOINTMENT (OUTPATIENT)
Dept: OBGYN CLINIC | Facility: HOSPITAL | Age: 38
End: 2023-09-23
Payer: COMMERCIAL

## 2023-09-23 LAB
ANTIBODY SCREEN: POSITIVE
BASOPHILS # BLD AUTO: 0.01 X10(3) UL (ref 0–0.2)
BASOPHILS NFR BLD AUTO: 0.1 %
DEPRECATED RDW RBC AUTO: 42.7 FL (ref 35.1–46.3)
DIRECT COOMBS POLY: NEGATIVE
EOSINOPHIL # BLD AUTO: 0.02 X10(3) UL (ref 0–0.7)
EOSINOPHIL NFR BLD AUTO: 0.2 %
ERYTHROCYTE [DISTWIDTH] IN BLOOD BY AUTOMATED COUNT: 13.1 % (ref 11–15)
FETAL SCREEN RESULT: NEGATIVE
HCT VFR BLD AUTO: 35.7 %
HGB BLD-MCNC: 12 G/DL
IMM GRANULOCYTES # BLD AUTO: 0.03 X10(3) UL (ref 0–1)
IMM GRANULOCYTES NFR BLD: 0.3 %
LYMPHOCYTES # BLD AUTO: 1.97 X10(3) UL (ref 1–4)
LYMPHOCYTES NFR BLD AUTO: 22.1 %
MCH RBC QN AUTO: 29.9 PG (ref 26–34)
MCHC RBC AUTO-ENTMCNC: 33.6 G/DL (ref 31–37)
MCV RBC AUTO: 89 FL
MONOCYTES # BLD AUTO: 0.59 X10(3) UL (ref 0.1–1)
MONOCYTES NFR BLD AUTO: 6.6 %
NEUTROPHILS # BLD AUTO: 6.28 X10 (3) UL (ref 1.5–7.7)
NEUTROPHILS # BLD AUTO: 6.28 X10(3) UL (ref 1.5–7.7)
NEUTROPHILS NFR BLD AUTO: 70.7 %
PLATELET # BLD AUTO: 183 10(3)UL (ref 150–450)
RBC # BLD AUTO: 4.01 X10(6)UL
RH BLOOD TYPE: NEGATIVE
WBC # BLD AUTO: 8.9 X10(3) UL (ref 4–11)

## 2023-09-23 PROCEDURE — 10907ZC DRAINAGE OF AMNIOTIC FLUID, THERAPEUTIC FROM PRODUCTS OF CONCEPTION, VIA NATURAL OR ARTIFICIAL OPENING: ICD-10-PCS | Performed by: OBSTETRICS & GYNECOLOGY

## 2023-09-23 PROCEDURE — 86901 BLOOD TYPING SEROLOGIC RH(D): CPT | Performed by: OBSTETRICS & GYNECOLOGY

## 2023-09-23 PROCEDURE — 85461 HEMOGLOBIN FETAL: CPT | Performed by: OBSTETRICS & GYNECOLOGY

## 2023-09-23 PROCEDURE — 86880 COOMBS TEST DIRECT: CPT | Performed by: OBSTETRICS & GYNECOLOGY

## 2023-09-23 PROCEDURE — 86077 PHYS BLOOD BANK SERV XMATCH: CPT | Performed by: OBSTETRICS & GYNECOLOGY

## 2023-09-23 PROCEDURE — 86870 RBC ANTIBODY IDENTIFICATION: CPT | Performed by: OBSTETRICS & GYNECOLOGY

## 2023-09-23 PROCEDURE — 3E033VJ INTRODUCTION OF OTHER HORMONE INTO PERIPHERAL VEIN, PERCUTANEOUS APPROACH: ICD-10-PCS | Performed by: OBSTETRICS & GYNECOLOGY

## 2023-09-23 PROCEDURE — 85025 COMPLETE CBC W/AUTO DIFF WBC: CPT | Performed by: OBSTETRICS & GYNECOLOGY

## 2023-09-23 PROCEDURE — 86850 RBC ANTIBODY SCREEN: CPT | Performed by: OBSTETRICS & GYNECOLOGY

## 2023-09-23 PROCEDURE — 86900 BLOOD TYPING SEROLOGIC ABO: CPT | Performed by: OBSTETRICS & GYNECOLOGY

## 2023-09-23 RX ORDER — IBUPROFEN 600 MG/1
600 TABLET ORAL ONCE AS NEEDED
Status: DISCONTINUED | OUTPATIENT
Start: 2023-09-23 | End: 2023-09-23 | Stop reason: HOSPADM

## 2023-09-23 RX ORDER — ACETAMINOPHEN 500 MG
1000 TABLET ORAL EVERY 6 HOURS PRN
Status: DISCONTINUED | OUTPATIENT
Start: 2023-09-23 | End: 2023-09-23 | Stop reason: HOSPADM

## 2023-09-23 RX ORDER — DOCUSATE SODIUM 100 MG/1
100 CAPSULE, LIQUID FILLED ORAL 2 TIMES DAILY
Status: DISCONTINUED | OUTPATIENT
Start: 2023-09-24 | End: 2023-09-25

## 2023-09-23 RX ORDER — TERBUTALINE SULFATE 1 MG/ML
0.25 INJECTION, SOLUTION SUBCUTANEOUS AS NEEDED
Status: DISCONTINUED | OUTPATIENT
Start: 2023-09-23 | End: 2023-09-23 | Stop reason: HOSPADM

## 2023-09-23 RX ORDER — DIAPER,BRIEF,INFANT-TODD,DISP
1 EACH MISCELLANEOUS EVERY 6 HOURS PRN
Status: DISCONTINUED | OUTPATIENT
Start: 2023-09-23 | End: 2023-09-25

## 2023-09-23 RX ORDER — ONDANSETRON 2 MG/ML
4 INJECTION INTRAMUSCULAR; INTRAVENOUS EVERY 6 HOURS PRN
Status: DISCONTINUED | OUTPATIENT
Start: 2023-09-23 | End: 2023-09-25

## 2023-09-23 RX ORDER — DOCUSATE SODIUM 100 MG/1
100 CAPSULE, LIQUID FILLED ORAL
Status: DISCONTINUED | OUTPATIENT
Start: 2023-09-23 | End: 2023-09-23

## 2023-09-23 RX ORDER — IBUPROFEN 600 MG/1
600 TABLET ORAL EVERY 6 HOURS PRN
Status: DISCONTINUED | OUTPATIENT
Start: 2023-09-23 | End: 2023-09-25

## 2023-09-23 RX ORDER — CITRIC ACID/SODIUM CITRATE 334-500MG
30 SOLUTION, ORAL ORAL AS NEEDED
Status: DISCONTINUED | OUTPATIENT
Start: 2023-09-23 | End: 2023-09-23 | Stop reason: HOSPADM

## 2023-09-23 RX ORDER — LIDOCAINE HYDROCHLORIDE 10 MG/ML
30 INJECTION, SOLUTION EPIDURAL; INFILTRATION; INTRACAUDAL; PERINEURAL ONCE
Status: DISCONTINUED | OUTPATIENT
Start: 2023-09-23 | End: 2023-09-23 | Stop reason: HOSPADM

## 2023-09-23 RX ORDER — IBUPROFEN 600 MG/1
600 TABLET ORAL EVERY 6 HOURS
Status: DISCONTINUED | OUTPATIENT
Start: 2023-09-23 | End: 2023-09-23

## 2023-09-23 RX ORDER — SIMETHICONE 80 MG
80 TABLET,CHEWABLE ORAL 3 TIMES DAILY PRN
Status: DISCONTINUED | OUTPATIENT
Start: 2023-09-23 | End: 2023-09-25

## 2023-09-23 RX ORDER — DEXTROSE, SODIUM CHLORIDE, SODIUM LACTATE, POTASSIUM CHLORIDE, AND CALCIUM CHLORIDE 5; .6; .31; .03; .02 G/100ML; G/100ML; G/100ML; G/100ML; G/100ML
INJECTION, SOLUTION INTRAVENOUS CONTINUOUS
Status: DISCONTINUED | OUTPATIENT
Start: 2023-09-23 | End: 2023-09-23 | Stop reason: HOSPADM

## 2023-09-23 RX ORDER — BISACODYL 10 MG
10 SUPPOSITORY, RECTAL RECTAL ONCE AS NEEDED
Status: DISCONTINUED | OUTPATIENT
Start: 2023-09-23 | End: 2023-09-25

## 2023-09-23 RX ORDER — ACETAMINOPHEN 500 MG
500 TABLET ORAL EVERY 6 HOURS PRN
Status: DISCONTINUED | OUTPATIENT
Start: 2023-09-23 | End: 2023-09-25

## 2023-09-23 RX ORDER — ONDANSETRON 2 MG/ML
4 INJECTION INTRAMUSCULAR; INTRAVENOUS EVERY 6 HOURS PRN
Status: DISCONTINUED | OUTPATIENT
Start: 2023-09-23 | End: 2023-09-23 | Stop reason: HOSPADM

## 2023-09-23 RX ORDER — SODIUM CHLORIDE, SODIUM LACTATE, POTASSIUM CHLORIDE, CALCIUM CHLORIDE 600; 310; 30; 20 MG/100ML; MG/100ML; MG/100ML; MG/100ML
INJECTION, SOLUTION INTRAVENOUS AS NEEDED
Status: DISCONTINUED | OUTPATIENT
Start: 2023-09-23 | End: 2023-09-23 | Stop reason: HOSPADM

## 2023-09-23 RX ORDER — ACETAMINOPHEN 500 MG
1000 TABLET ORAL EVERY 6 HOURS PRN
Status: DISCONTINUED | OUTPATIENT
Start: 2023-09-23 | End: 2023-09-25

## 2023-09-23 RX ORDER — ACETAMINOPHEN 500 MG
500 TABLET ORAL EVERY 6 HOURS PRN
Status: DISCONTINUED | OUTPATIENT
Start: 2023-09-23 | End: 2023-09-23 | Stop reason: HOSPADM

## 2023-09-23 RX ORDER — AMMONIA INHALANTS 0.04 G/.3ML
0.3 INHALANT RESPIRATORY (INHALATION) AS NEEDED
Status: DISCONTINUED | OUTPATIENT
Start: 2023-09-23 | End: 2023-09-25

## 2023-09-23 NOTE — PROGRESS NOTES
Limited ultrasound report    Number of gestations: perez  Fetal Presentation: cephalic  FHR: 022  Placenta: anterior  Fluid: grossly normal    Impression: viable IUP, cephalic presentation

## 2023-09-23 NOTE — H&P
[address] Patient Status:  Inpatient    1985 MRN [id number]   Location [facility] Attending Sofia Fabian MD   [facility] Day # 0 PCP Danielle Juarez MD     Date of Admission:  2023      HPI:   Esther Stover is a 41year old  female, current EGA of 39w1d with an estimated date of delivery of: Estimated Date of Delivery: 23      Esther Stover is being admitted for  ECV vs IOL . Her current obstetrical history is significant for Rh Negative, advanced maternal age, unstable lie . Patient reports no complaints . Fetal Movement: normal.     History   Obstetric History:   OB History    Para Term  AB Living   4 3 3 0 0 3   SAB IAB Ectopic Multiple Live Births   0 0 0 0 3      # Outcome Date GA Lbr Montrell/2nd Weight Sex Delivery Anes PTL Lv   4 Current            3 Term 21 40w6d / 00:15 8 lb 15.9 oz (4.08 kg) F NORMAL SPONT EPI N JULI   2 Term 19 39w4d 04:56 / 00:30 8 lb 14.9 oz (4.05 kg) F NORMAL SPONT None N JULI   1 Term 16 41w1d  7 lb 8 oz (3.402 kg) M NORMAL SPONT   JULI      Obstetric Comments   Irregular menses - q2-3 months   No h/o STI's     Past Medical History:   Past Medical History:   Diagnosis Date    ACNE     ocp    ANEMIA     19 none since, iron deficiency    OTHER DISEASES     uti as child, normal workup    OTHER DISEASES     hemorrhagic left om     PCOS (polycystic ovarian syndrome)      Past Social History: History reviewed. No pertinent surgical history.   Family History:   Family History   Problem Relation Age of Onset    Lipids Father     Cancer Father     Other (Other) Mother         uti and surgery for this    Heart Disorder Maternal Grandmother         MI 52's    Heart Attack Maternal Grandmother     Cancer Maternal Grandfather         pancreatic    Diabetes Paternal Grandfather     Breast Cancer Neg     Thyroid Disorder Neg      Social History: Social History Tobacco Use      Smoking status: Never      Smokeless tobacco: Never    Alcohol use: Not Currently      Comment: 1x/wk - 2 drinks       Allergies/Medications: Allergies:   No Known Allergies  Medications:  Prenatal 27-0.8 MG Oral Tab, Take 1 tablet by mouth daily. , Disp: , Rfl: , Past Week at 0900  acetaminophen 325 MG Oral Tab, Take 2 tablets (650 mg total) by mouth every 6 (six) hours as needed. , Disp: 30 tablet, Rfl: 0  ibuprofen 600 MG Oral Tab, Take 1 tablet (600 mg total) by mouth every 6 (six) hours. , Disp: 30 tablet, Rfl: 0        Review of Systems:   As documented in HPI    no complaints    Physical Exam:        Constitutional: alert, appears stated age, and cooperative  Respiratory:  Nonlabored respirations  Cardiac: regular rate and rhythm  Abdomen: soft, nontender, nondistended, no abnormal masses, no epigastric pain and FHT present  Fetal Surveillance:  140 BPM; Fetal heart variability: moderate  Sterile vaginal exam: Dilation: 2 cm dilation  Effacement: 75%  Station: -2  Position: Cephalic  Cervix: no lesions or cervical motion tenderness    Results:     Lab Results   Component Value Date    TREPONEMALAB Nonreactive 07/11/2023    RAPIDHIVSCRN Negative 07/11/2023    ABO O 05/22/2021    RH Negative 05/22/2021    WBC 9.5 09/08/2023    HGB 12.1 09/08/2023    HCT 35.8 09/08/2023    .0 09/08/2023    CREATSERUM 0.72 05/28/2019    BUN 18.0 05/28/2019     05/28/2019    K 3.91 05/28/2019     05/28/2019    CO2 25.5 05/28/2019    GLU 90 05/28/2019    CA 9.2 05/28/2019    ALB 4.5 05/28/2019    ALKPHO 67 05/28/2019    BILT 0.24 05/28/2019    TP 7.3 05/28/2019    AST 14 05/28/2019    ALT 18 05/28/2019    PTT 31.8 01/22/2019    INR 1.0 01/22/2019    T4F 1.29 08/14/2014    TSH 0.556 05/28/2019       Lab Results   Component Value Date    RPR Non Reactive 03/04/2016    SPECGRAVITY 1.010 05/19/2021    NITRITE Negative 05/19/2021       No results found.       Assessment/Plan:    Charly Becerril is at an estimated gestational age of 36w3d with an estimated date of delivery of:  Estimated Date of Delivery: 9/29/23    Not in labor. Obstetrical history significant for  see above . Admission problem(s):    * No active hospital problems. *        Risks, benefits, alternatives and possible complications have been discussed in detail with the patient. Pre-admission, admission, and post admission procedures and expectations were discussed in detail. All questions answered, all appropriate consents will be signed at the Hospital. Admission is planned for today. Intervention: IV Pitocin induction.     Dennis Jhaveri MD  9/23/2023  9:26 AM

## 2023-09-24 LAB
BASOPHILS # BLD AUTO: 0.03 X10(3) UL (ref 0–0.2)
BASOPHILS NFR BLD AUTO: 0.2 %
DEPRECATED RDW RBC AUTO: 42.7 FL (ref 35.1–46.3)
EOSINOPHIL # BLD AUTO: 0.02 X10(3) UL (ref 0–0.7)
EOSINOPHIL NFR BLD AUTO: 0.1 %
ERYTHROCYTE [DISTWIDTH] IN BLOOD BY AUTOMATED COUNT: 13.2 % (ref 11–15)
HCT VFR BLD AUTO: 30.8 %
HGB BLD-MCNC: 10.6 G/DL
IMM GRANULOCYTES # BLD AUTO: 0.07 X10(3) UL (ref 0–1)
IMM GRANULOCYTES NFR BLD: 0.5 %
LYMPHOCYTES # BLD AUTO: 2.01 X10(3) UL (ref 1–4)
LYMPHOCYTES NFR BLD AUTO: 14.1 %
MCH RBC QN AUTO: 30.6 PG (ref 26–34)
MCHC RBC AUTO-ENTMCNC: 34.4 G/DL (ref 31–37)
MCV RBC AUTO: 89 FL
MONOCYTES # BLD AUTO: 0.92 X10(3) UL (ref 0.1–1)
MONOCYTES NFR BLD AUTO: 6.5 %
NEUTROPHILS # BLD AUTO: 11.19 X10 (3) UL (ref 1.5–7.7)
NEUTROPHILS # BLD AUTO: 11.19 X10(3) UL (ref 1.5–7.7)
NEUTROPHILS NFR BLD AUTO: 78.6 %
PLATELET # BLD AUTO: 176 10(3)UL (ref 150–450)
RBC # BLD AUTO: 3.46 X10(6)UL
WBC # BLD AUTO: 14.2 X10(3) UL (ref 4–11)

## 2023-09-24 PROCEDURE — 85025 COMPLETE CBC W/AUTO DIFF WBC: CPT | Performed by: OBSTETRICS & GYNECOLOGY

## 2023-09-24 PROCEDURE — 3E0334Z INTRODUCTION OF SERUM, TOXOID AND VACCINE INTO PERIPHERAL VEIN, PERCUTANEOUS APPROACH: ICD-10-PCS | Performed by: OBSTETRICS & GYNECOLOGY

## 2023-09-24 RX ORDER — IBUPROFEN 600 MG/1
600 TABLET ORAL EVERY 6 HOURS PRN
Qty: 30 TABLET | Refills: 0
Start: 2023-09-24

## 2023-09-24 NOTE — L&D DELIVERY NOTE
Neo Morales [[id number]]      Labor Events     labor?: No   steroids?: None  Antibiotics received during labor?: No  Rupture date/time: 2023 1604     Rupture type: AROM  Fluid color: Clear  Labor type:  Induced Onset of Labor  Indications for induction: Unstable Lie  Intrapartum & labor complications: Variable decelerations       Labor Event Times    Labor onset date/time: 2023 1030  Dilation complete date/time: 2023  Start pushing date/time: 2023 183        Presentation    Presentation: Vertex  Position: Occiput Anterior       Operative Delivery    Operative Vaginal Delivery: No                      Shoulder Dystocia    Shoulder Dystocia: No             Anesthesia    Method: None               Delivery      Head delivery date/time: 2023 19:01:07   Delivery date/time:  23 19:02:12   Delivery type: Normal spontaneous vaginal delivery    Details:     Delivery location: delivery room  Delivery Room Temperature: 72       Delivery Providers    Delivering Clinician: Pasquale Polk MD   Delivery personnel:  Provider Role   Regan Lutz RN Baby Nurse   Ching Pop, RN Delivery Nurse             Cord    Vessels: 3 Vessels  Complications: None  Timed cord clamping: Yes  Time in sec: 600  Cord blood disposition: to lab  Gases sent?: No       Resuscitation    Method: None       [place] Measurements    No data filed       Placenta    Date/time: 2023 1918  Removal: Spontaneous  Appearance: Intact       Apgars    Living status: Living   Apgar Scoring Key:    0 1 2    Skin color Blue or pale Acrocyanotic Completely pink    Heart rate Absent <100 bpm >100 bpm    Reflex irritability No response Grimace Cry or active withdrawal    Muscle tone Limp Some flexion Active motion    Respiratory effort Absent Weak cry; hypoventilation Good, crying              1 Minute:  5 Minute:  10 Minute:  15 Minute:  20 Minute:      Skin color: 0  1       Heart rate: 2  2 Reflex irritablity: 2  2       Muscle tone: 2  2       Respiratory effort: 2  2       Total: 8  9          Apgars assigned by: GUERRERO KIDD  Max disposition: with mother       Skin to Skin    Skin to skin initiated date/time: 2023  Skin to skin with: Mother       Vaginal Count    Initial count RN: Salvador Wiley RN  Initial count Tech: Monster Leather   Sponges   Sharps    Initial counts 10   0    Final counts               Delivery (Maternal)    No data filed            Fetal vertex delivered over intact perineum. No nuchal cord. Remainder of infant delivered without difficulty. Placenta delivered spontaneously. Inspection of perineum revealed hemostatic 1st degree vaginal laceration, no repair needed.

## 2023-09-24 NOTE — PROGRESS NOTES
Patient up to bathroom with assist x 2. Voided Unable to void at this time. Patient transferred to mother/baby room 362 per wheelchair in stable condition with baby and personal belongings. Accompanied by significant other and staff. Report given to mother/baby [facility].

## 2023-09-24 NOTE — LACTATION NOTE
LACTATION NOTE - MOTHER      Evaluation Type: Inpatient    Problems identified  Problems identified: Knowledge deficit    Maternal history  Maternal history: AMA; Anemia; Polycystic ovarian syndrome (PCOS)    Breastfeeding goal  Breastfeeding goal: To maintain breast milk feeding per patient goal    Maternal Assessment  Bilateral Breasts: Soft;Symmetrical  Bilateral Nipples: Everted  Prior breastfeeding experience (comment below): Successful;Multip  Prior BF experience: comment:  other children >18 months  Breastfeeding Assistance: Breastfeeding assistance provided with permission    Pain assessment  Location/Comment: denies  Treatment of Sore Nipples: Expressed breast milk    Guidelines for use of:  Current use of pump[de-identified] not indicated  Suggested use of pump: Pump if infant is not latching to breast  Other (comment): Reviewed positioning with mom, feeding frequency and attempted to latch infnat but recent circumcision and infant sleeping at breast. Encouraged mom to call with feeding cues to assess latch. Mom wants follow up tomorrow.

## 2023-09-25 VITALS
DIASTOLIC BLOOD PRESSURE: 66 MMHG | HEART RATE: 59 BPM | RESPIRATION RATE: 16 BRPM | SYSTOLIC BLOOD PRESSURE: 102 MMHG | TEMPERATURE: 99 F

## 2023-09-25 NOTE — LACTATION NOTE
Pt stated his co worker tested positive for COVID 19 and Mr Kvng Catalan would like an order for a test. Please contact pt to inform where he would need to go to get the test done when available This note was copied from a baby's chart. 09/25/23 0900   Evaluation Type   Evaluation Type Inpatient   Problems & Assessment   Problems Diagnosed or Identified Shallow latch   Infant Assessment Hunger cues present;Skin color: pink or appropriate for ethnicity   Muscle tone Appropriate for GA   Feeding Assessment   Summary Current Feeding Breastfeeding exclusively   Last 24 hour feeding summary see I&O   Breastfeeding Assessment Assisted with breastfeeding w/mother's permission;Sustained nutrititive latch w/audible swallows; Calm and ready to breastfeed;Coordinated suck/swallow;Deep latch achieved and observed   Breastfeeding lasted # of minutes 10  (still nursing)   Breastfeeding Positions cross cradle;right breast   Latch 2   Audible Sucks/Swallows 2   Type of Nipple 2   Comfort (Breast/Nipple) 2   Hold (Positioning) 2   LATCH Score 10

## 2023-09-25 NOTE — LACTATION NOTE
09/25/23 0900   Evaluation Type   Evaluation Type Inpatient   Problems identified   Problems identified Knowledge deficit   Maternal history   Maternal history AMA; Anemia; Polycystic ovarian syndrome (PCOS)   Breastfeeding goal   Breastfeeding goal To maintain breast milk feeding per patient goal   Maternal Assessment   Bilateral Breasts Symmetrical;Soft   Bilateral Nipples Everted;Colostrum easily expressed   Prior breastfeeding experience (comment below) Successful;Multip   Prior BF experience: comment  other children >18 months   Breastfeeding Assistance Breastfeeding assistance provided with permission   Pain assessment   Location/Comment denies   Guidelines for use of:   Suggested use of pump Pump if infant is not latching to breast

## (undated) NOTE — LETTER
Dear new mom:    We've missed you! The nurses of [facility] have tried to reach you by phone to ask if you had any questions regarding your health or the care of your new little one.     Please feel free to call your doctor with

## (undated) NOTE — LETTER
[facility]  Administration of Anesthesia  1. I, Oleksandr Emmett, or _________________________________ acting on her behalf, (Patient) (Dependent/Representative) request to receive anesthesia for my pending procedure/operation/treatment.   A bleeding, seizure, cardiac arrest and death. 7. AWARENESS: I understand that it is possible (but unlikely) to have explicit memory of events from the operating room while under general anesthesia.   8. ELECTROCONVULSIVE THERAPY PATIENTS: This consent serve below affirms that prior to the time of the procedure, I have explained to the patient and/or his/her guardian, the risks and benefits of undergoing anesthesia, as well as any reasonable alternatives.     ___________________________________________________